# Patient Record
(demographics unavailable — no encounter records)

---

## 2024-11-19 NOTE — END OF VISIT
[] : Resident [FreeTextEntry3] : Patient seen and examined with resident, chart reviewed.  No LUTS or uremic symptoms noted on encounter today.  Noted heavy proteinuria, controlled DM and new diagnosis of CKD stage 3a.  Likely realted to DM but given his uncontrolled BP and relatively high proteinuria, will send for serum proteinuria evaluation and also send labwork for secondary hypertension evaluation.  Starting Farxiga for better DKD prevention.  RTC 4-6 weeks.   [Time Spent: ___ minutes] : I have spent [unfilled] minutes of time on the encounter which excludes teaching and separately reported services.

## 2024-11-19 NOTE — PHYSICAL EXAM
[General Appearance - Alert] : alert [General Appearance - In No Acute Distress] : in no acute distress [Outer Ear] : the ears and nose were normal in appearance [No CVA Tenderness] : no ~M costovertebral angle tenderness

## 2024-11-19 NOTE — ASSESSMENT
[FreeTextEntry1] : #CKD IIIa -Cr-1.7, eGFR 50 -alb/ crt-2566 -Work up sent for 2/2 causes of HTN -metanephrines, SUKHJINDER, ANCA, C3/C4, ALd/Renin ,am cortisol, S FLA, immunofixation   #HTN -Uncontrolled. BP today 180/114 -On lisinopril and amlodipine -Started on chlorthaldione and kerendia  #DM  -Controlled A1c -6.1 -On metformin 1000BID -Started on farxiga based on high urine protein  -RTc in 1 month

## 2024-11-19 NOTE — REVIEW OF SYSTEMS
[Fever] : no fever [Eyesight Problems] : no eyesight problems [Nosebleeds] : no nosebleeds [Chest Pain] : no chest pain [Palpitations] : no palpitations [Shortness Of Breath] : no shortness of breath [Wheezing] : no wheezing [Abdominal Pain] : no abdominal pain [Vomiting] : no vomiting [Dysuria] : no dysuria

## 2024-11-19 NOTE — HISTORY OF PRESENT ILLNESS
[FreeTextEntry1] : 44 y/o male with PMHX of DM, HTN, HLD, CKD IIIa who presents to the clinic to establish care for CKD. He denies any nocturia, dysuria, polyuria or polydipsia during this time.  Labs  09/2024 : Creat 1.7, eGfr 50 , K 4.6, Ur Creat 100, Alb/ cr 2566  Bp 180/114

## 2025-02-10 NOTE — REVIEW OF SYSTEMS
[Fever] : no fever [Discharge] : no discharge [Earache] : no earache [Chest Pain] : no chest pain [Palpitations] : no palpitations [Shortness Of Breath] : no shortness of breath [Wheezing] : no wheezing [Abdominal Pain] : no abdominal pain [Dysuria] : no dysuria [Joint Pain] : no joint pain [Itching] : no itching [Headache] : no headache [Suicidal] : not suicidal [Easy Bleeding] : no easy bleeding

## 2025-02-10 NOTE — HISTORY OF PRESENT ILLNESS
[FreeTextEntry1] : f/u [de-identified] : 46 yo M with PMH of DM, HTN, HLD, erectile dysfunction, CKD IIIa, presents for follow up. Pt reports that he ran out of medications 2 days ago. He has no acute complaints.

## 2025-02-10 NOTE — ASSESSMENT
[FreeTextEntry1] : 44 yo M with PMH of DM, HTN, HLD, erectile dysfunction, CKD IIIa, presents for follow up. Pt reports that he ran out of medications 2 days ago. He has no acute complaints.  #DM -c/w Metformin 1000mg BID -A1c in 9/24: 6.1 -urine ACR: 2566 -podiatry and ophthalmology referal sent last visit  #CKD IIIa - Cr in 9/24: 1.7 ( unclear baseline) - follows nephro Dr Loving. next appt in March - on chlorthalidone 25mg, kerendia 10mg, and farxiga 10mg  #HTN -BP today 200/100. Pt has been off meds for 2 days - home BP w/  meds: 170/100 - denies headache, dizziness, CP, SOB, blurry vision - c/w Lisinopril 40mg and amlodipine 10mg - counseled on lifestyle modifications  #HLD - lipid profile in 09/24: LDL 96. HDL 44,  - counseled on lifestyle modifications  #erectile dysfunction - tried sildanafil w/o improvement - tadalafil 10mg prescribed   #HCM -GI referral sent for colonoscopy -RTC in 1 mo w/ routine labs

## 2025-02-10 NOTE — HISTORY OF PRESENT ILLNESS
[FreeTextEntry1] : f/u [de-identified] : 46 yo M with PMH of DM, HTN, HLD, erectile dysfunction, CKD IIIa, presents for follow up. Pt reports that he ran out of medications 2 days ago. He has no acute complaints.

## 2025-02-10 NOTE — PHYSICAL EXAM
[No Acute Distress] : no acute distress [Normal Sclera/Conjunctiva] : normal sclera/conjunctiva [Normal Outer Ear/Nose] : the outer ears and nose were normal in appearance [No JVD] : no jugular venous distention [No Respiratory Distress] : no respiratory distress  [Normal Rate] : normal rate  [No Edema] : there was no peripheral edema [Soft] : abdomen soft [No CVA Tenderness] : no CVA  tenderness [No Joint Swelling] : no joint swelling [Coordination Grossly Intact] : coordination grossly intact [Normal Affect] : the affect was normal

## 2025-02-10 NOTE — REVIEW OF SYSTEMS
yes [Fever] : no fever [Discharge] : no discharge [Earache] : no earache [Chest Pain] : no chest pain [Palpitations] : no palpitations [Shortness Of Breath] : no shortness of breath [Wheezing] : no wheezing [Abdominal Pain] : no abdominal pain [Dysuria] : no dysuria [Joint Pain] : no joint pain [Itching] : no itching [Headache] : no headache [Suicidal] : not suicidal [Easy Bleeding] : no easy bleeding

## 2025-03-10 NOTE — ASSESSMENT
[FreeTextEntry1] : 46 yo M with PMH of DM, HTN, HLD, CKD IIIa, erectile dysfunction, and hemorrhoids presents for follow up on blood pressure. Patient has been taking BP at home and is still elevated 190s-200s/100s. He has no acute complaints.  #DM -c/w Metformin 1000mg BID -A1c in 2/25 remained stable at 6.1 -urine ACR: 2566 -podiatry and ophthalmology referral sent previously  #CKD IIIa - Cr in 9/24: 1.7 ( unclear baseline), 2/25 increased to 1.9 - follows nephro Dr Loving. next appt tomorrow March 11th - on chlorthalidone 25mg, kerendia 10mg, and farxiga 10mg--has not been taking chlorthalidone and was restarted today  #HTN -BP today 194/108. Pt has been taking meds except for chlorthalidone - home BP w/  meds: 200s/100s - denies headache, dizziness, CP, SOB, blurry vision - c/w Lisinopril 40mg and amlodipine 10mg - counseled on lifestyle modifications and DASH diet   #HLD - lipid profile in 02/25: LDL 89. HDL 48,  - counseled on lifestyle modifications   #hemorrhoids/blood per rectum , has GI appt for August, will arrange for earlier appointment  #HCM -RTC in 1 mo for f/u on BP after restarting chlorthalidone

## 2025-03-10 NOTE — PHYSICAL EXAM
[No Acute Distress] : no acute distress [Normal Sclera/Conjunctiva] : normal sclera/conjunctiva [Normal Outer Ear/Nose] : the outer ears and nose were normal in appearance [No JVD] : no jugular venous distention [No Respiratory Distress] : no respiratory distress  [Normal Rate] : normal rate  [Soft] : abdomen soft [No CVA Tenderness] : no CVA  tenderness [No Joint Swelling] : no joint swelling [Coordination Grossly Intact] : coordination grossly intact [Normal Affect] : the affect was normal [Well Nourished] : well nourished [Well Developed] : well developed [No Lymphadenopathy] : no lymphadenopathy [Supple] : supple [Clear to Auscultation] : lungs were clear to auscultation bilaterally [Regular Rhythm] : with a regular rhythm [Normal S1, S2] : normal S1 and S2 [Non Tender] : non-tender [Normal] : no CVA or spinal tenderness [de-identified] : +1 pitting LLE edema > RLE

## 2025-03-10 NOTE — REVIEW OF SYSTEMS
[Lower Ext Edema] : lower extremity edema [Diarrhea] : diarrhea [Negative] : Integumentary [Fever] : no fever [Chills] : no chills [Fatigue] : no fatigue [Discharge] : no discharge [Earache] : no earache [Chest Pain] : no chest pain [Palpitations] : no palpitations [Claudication] : no  leg claudication [Shortness Of Breath] : no shortness of breath [Wheezing] : no wheezing [Cough] : no cough [Dyspnea on Exertion] : not dyspnea on exertion [Abdominal Pain] : no abdominal pain [Nausea] : no nausea [Headache] : no headache [Suicidal] : not suicidal [Easy Bleeding] : no easy bleeding [FreeTextEntry7] : bright red blood per rectum

## 2025-03-10 NOTE — HISTORY OF PRESENT ILLNESS
[FreeTextEntry1] : f/u on blood pressure [de-identified] : 46 yo M with PMH of DM, HTN, HLD, CKD IIIa, erectile dysfunction, and hemorrhoids presents for follow up on blood pressure. Patient has been taking BP at home and is still elevated 190s-200s/100s. He has not been taking Chlorthalidone as prescribed. Pt mentioned has also had blood per rectum. He is scheduled to see his Nephrologist tomorrow 3/11.

## 2025-05-07 NOTE — PHYSICAL EXAM
[No Acute Distress] : no acute distress [Well Nourished] : well nourished [Well Developed] : well developed [Normal Sclera/Conjunctiva] : normal sclera/conjunctiva [Normal Outer Ear/Nose] : the outer ears and nose were normal in appearance [No JVD] : no jugular venous distention [No Lymphadenopathy] : no lymphadenopathy [Supple] : supple [No Respiratory Distress] : no respiratory distress  [Clear to Auscultation] : lungs were clear to auscultation bilaterally [Normal Rate] : normal rate  [Regular Rhythm] : with a regular rhythm [Normal S1, S2] : normal S1 and S2 [Soft] : abdomen soft [Non Tender] : non-tender [No CVA Tenderness] : no CVA  tenderness [Normal] : no CVA or spinal tenderness [No Joint Swelling] : no joint swelling [Coordination Grossly Intact] : coordination grossly intact [Normal Affect] : the affect was normal [de-identified] : +1 pitting LLE edema > RLE

## 2025-05-07 NOTE — ASSESSMENT
[FreeTextEntry1] : #DM -c/w Metformin 1000mg BID -A1c in 2/25 remained stable at 6.1 -urine ACR: 2566 -podiatry and ophthalmology referral sent previously  #CKD IIIa - Cr in 9/24: 1.7 ( unclear baseline), 2/25 increased to 1.9 - on chlorthalidone 25mg,  - cw farxiga 10mg qd ( patient wasn't taking it, he will start taking it now he said) - kerendia 10mg was prescribed but he wasn't taking it, told the patient to follow up with Dr Loving 6/2025 regarding kerendia   #HTN - denies headache, dizziness, CP, SOB, blurry vision - stop Lisinopril 40mg and start valsartan 320mg qd - cw amlodipine 10mg - cw chlorthalidone 25mg qd - counseled on lifestyle modifications and DASH diet   #HLD - lipid profile in 02/25: LDL 89. HDL 48,  - counseled on lifestyle modifications   #hemorrhoids/blood per rectum  had GI appt 3/2025 NO SHOW he said he doesn't have any blood per rectum anymore and he doesn't want to see them if he has bleeding again he will schedule an appt   #HCM -RTC in 3 months with labs - meds refilled to [facility]

## 2025-05-07 NOTE — HISTORY OF PRESENT ILLNESS
[FreeTextEntry1] : f/u [de-identified] : 44 yo M with PMH of DM, HTN, HLD, CKD IIIa, erectile dysfunction, and hemorrhoids  --> presents for follow up. the patient had uncontrolled BP when he was taking lisinopril and amlodipine but not taking chlorthalidone now the patient says he is taking all his medications but his BP readings at home are stil 170s.  he denies chest pain, sob, dizziness, blurry vision, no other signs/sx.